# Patient Record
Sex: FEMALE | Race: WHITE | NOT HISPANIC OR LATINO | URBAN - METROPOLITAN AREA
[De-identification: names, ages, dates, MRNs, and addresses within clinical notes are randomized per-mention and may not be internally consistent; named-entity substitution may affect disease eponyms.]

---

## 2023-08-22 ENCOUNTER — OUT OF OFFICE VISIT (OUTPATIENT)
Dept: URBAN - METROPOLITAN AREA MEDICAL CENTER 25 | Facility: MEDICAL CENTER | Age: 83
End: 2023-08-22
Payer: MEDICARE

## 2023-08-22 ENCOUNTER — LAB OUTSIDE AN ENCOUNTER (OUTPATIENT)
Dept: URBAN - METROPOLITAN AREA CLINIC 19 | Facility: CLINIC | Age: 83
End: 2023-08-22

## 2023-08-22 DIAGNOSIS — D64.89 ANEMIA DUE TO OTHER CAUSE: ICD-10-CM

## 2023-08-22 DIAGNOSIS — K92.1 ACUTE MELENA: ICD-10-CM

## 2023-08-22 PROCEDURE — 99204 OFFICE O/P NEW MOD 45 MIN: CPT | Performed by: INTERNAL MEDICINE

## 2023-08-23 ENCOUNTER — OUT OF OFFICE VISIT (OUTPATIENT)
Dept: URBAN - METROPOLITAN AREA MEDICAL CENTER 25 | Facility: MEDICAL CENTER | Age: 83
End: 2023-08-23
Payer: MEDICARE

## 2023-08-23 DIAGNOSIS — K92.1 ACUTE MELENA: ICD-10-CM

## 2023-08-23 DIAGNOSIS — K22.89 DILATATION OF ESOPHAGUS: ICD-10-CM

## 2023-08-23 DIAGNOSIS — D62 ABLA (ACUTE BLOOD LOSS ANEMIA): ICD-10-CM

## 2023-08-23 DIAGNOSIS — K63.89 APPENDICITIS EPIPLOICA: ICD-10-CM

## 2023-08-23 PROCEDURE — 45380 COLONOSCOPY AND BIOPSY: CPT | Performed by: INTERNAL MEDICINE

## 2023-08-23 PROCEDURE — 43235 EGD DIAGNOSTIC BRUSH WASH: CPT | Performed by: INTERNAL MEDICINE

## 2023-08-30 ENCOUNTER — TELEPHONE ENCOUNTER (OUTPATIENT)
Dept: URBAN - METROPOLITAN AREA CLINIC 19 | Facility: CLINIC | Age: 83
End: 2023-08-30

## 2023-09-01 ENCOUNTER — TELEPHONE ENCOUNTER (OUTPATIENT)
Dept: URBAN - METROPOLITAN AREA CLINIC 19 | Facility: CLINIC | Age: 83
End: 2023-09-01

## 2023-09-05 ENCOUNTER — WEB ENCOUNTER (OUTPATIENT)
Dept: URBAN - METROPOLITAN AREA CLINIC 111 | Facility: CLINIC | Age: 83
End: 2023-09-05

## 2023-09-06 ENCOUNTER — OFFICE VISIT (OUTPATIENT)
Dept: URBAN - METROPOLITAN AREA CLINIC 111 | Facility: CLINIC | Age: 83
End: 2023-09-06
Payer: MEDICARE

## 2023-09-06 VITALS
WEIGHT: 171 LBS | SYSTOLIC BLOOD PRESSURE: 176 MMHG | HEIGHT: 62 IN | HEART RATE: 85 BPM | TEMPERATURE: 97.7 F | DIASTOLIC BLOOD PRESSURE: 87 MMHG | BODY MASS INDEX: 31.47 KG/M2

## 2023-09-06 DIAGNOSIS — K64.8 INTERNAL HEMORRHOID, BLEEDING: ICD-10-CM

## 2023-09-06 DIAGNOSIS — D50.8 ACHLORHYDRIC ANEMIA: ICD-10-CM

## 2023-09-06 DIAGNOSIS — K21.9 ACID REFLUX DISEASE WITH ULCER: ICD-10-CM

## 2023-09-06 PROBLEM — 87522002: Status: ACTIVE | Noted: 2023-09-06

## 2023-09-06 PROCEDURE — 99204 OFFICE O/P NEW MOD 45 MIN: CPT | Performed by: NURSE PRACTITIONER

## 2023-09-06 RX ORDER — PANTOPRAZOLE SODIUM 40 MG/1
1 TABLET TABLET, DELAYED RELEASE ORAL ONCE A DAY
Qty: 90 TABLET | Refills: 3 | OUTPATIENT
Start: 2023-09-06

## 2023-09-06 RX ORDER — HYDROCORTISONE ACETATE 25 MG/1
1 SUPPOSITORY SUPPOSITORY RECTAL ONCE A DAY
Qty: 12 | Refills: 0 | OUTPATIENT
Start: 2023-09-06 | End: 2023-10-06

## 2023-09-06 RX ORDER — ZOLPIDEM TARTRATE 5 MG/1
TABLET, FILM COATED ORAL
Qty: 0 | Refills: 0 | Status: ACTIVE | COMMUNITY
Start: 1900-01-01

## 2023-09-06 NOTE — HPI-TODAY'S VISIT:
82 yo female presents follow up after recent hospitalization for melena. She was hospitalized at [facility] [place] 8/24 for GI bleed. EGD on 8/23/23 [facility], one superficial esophageal ulcer. Colonoscopy--diverticulosis, benign polyp and non-bleeding hemorrhoids. CT abd 8/24/23 no acute abd finding, fatty liver and persistent splenomegaly. Hemoglobin was 11.4. She was treated with abx at that time.  She continues to have intermittent rectal discomfort with bright red blood on toilet tissue. Reports epigastric discomfort and heart burn after eating.  Denies fever, chill, n/v or dysphagia.

## 2023-09-13 LAB
ABSOLUTE BASOPHILS: 48
ABSOLUTE EOSINOPHILS: 178
ABSOLUTE LYMPHOCYTES: 1579
ABSOLUTE MONOCYTES: 398
ABSOLUTE NEUTROPHILS: 2597
BASOPHILS: 1
EOSINOPHILS: 3.7
FERRITIN, SERUM: 25
HEMATOCRIT: 35
HEMOGLOBIN: 11.7
IRON BIND.CAP.(TIBC): 328
IRON SATURATION: 16
IRON: 51
LYMPHOCYTES: 32.9
MCH: 29.5
MCHC: 33.4
MCV: 88.4
MONOCYTES: 8.3
MPV: 10.6
NEUTROPHILS: 54.1
PLATELET COUNT: 207
RDW: 13.1
RED BLOOD CELL COUNT: 3.96
WHITE BLOOD CELL COUNT: 4.8

## 2023-10-06 ENCOUNTER — OFFICE VISIT (OUTPATIENT)
Dept: URBAN - METROPOLITAN AREA CLINIC 111 | Facility: CLINIC | Age: 83
End: 2023-10-06
Payer: MEDICARE

## 2023-10-06 ENCOUNTER — WEB ENCOUNTER (OUTPATIENT)
Dept: URBAN - METROPOLITAN AREA CLINIC 111 | Facility: CLINIC | Age: 83
End: 2023-10-06

## 2023-10-06 VITALS
WEIGHT: 168 LBS | HEIGHT: 62 IN | SYSTOLIC BLOOD PRESSURE: 128 MMHG | TEMPERATURE: 97.5 F | HEART RATE: 93 BPM | BODY MASS INDEX: 30.91 KG/M2 | DIASTOLIC BLOOD PRESSURE: 77 MMHG

## 2023-10-06 DIAGNOSIS — K76.0 FATTY LIVER: ICD-10-CM

## 2023-10-06 PROCEDURE — 99213 OFFICE O/P EST LOW 20 MIN: CPT | Performed by: NURSE PRACTITIONER

## 2023-10-06 RX ORDER — HYDROCORTISONE ACETATE 25 MG/1
1 SUPPOSITORY SUPPOSITORY RECTAL ONCE A DAY
Qty: 12 | Refills: 0 | Status: ACTIVE | COMMUNITY
Start: 2023-09-06 | End: 2023-10-06

## 2023-10-06 RX ORDER — PANTOPRAZOLE SODIUM 40 MG/1
1 TABLET TABLET, DELAYED RELEASE ORAL ONCE A DAY
Qty: 90 TABLET | Refills: 3 | Status: ACTIVE | COMMUNITY
Start: 2023-09-06

## 2023-10-06 RX ORDER — ZOLPIDEM TARTRATE 5 MG/1
TABLET, FILM COATED ORAL
Qty: 0 | Refills: 0 | Status: ACTIVE | COMMUNITY
Start: 1900-01-01

## 2023-10-06 NOTE — HPI-TODAY'S VISIT:
82 yo female presents 1 month follow up. Patient was last seen by me on 9/6/23 after  hospitalization  [facility] 8/22 to 8/24 for GI bleed. Labs rev'd on 9/12/23 iron study nml, h/h 11/7/35.0. Patient feeling better. Denies any gi symptoms. No melena or hematochezia. She doesn't drink alcohol or take Tylenol or NSAIDs. EGD on 8/23/23 [facility], one superficial esophageal ulcer. Colonoscopy--diverticulosis, benign polyp and non-bleeding hemorrhoids. CT abd 8/24/23 no acute abd finding, fatty liver and persistent splenomegaly.

## 2023-10-09 PROBLEM — 197321007: Status: ACTIVE | Noted: 2023-10-09

## 2023-10-10 ENCOUNTER — LAB OUTSIDE AN ENCOUNTER (OUTPATIENT)
Dept: URBAN - METROPOLITAN AREA CLINIC 111 | Facility: CLINIC | Age: 83
End: 2023-10-10

## 2023-10-11 LAB
ABSOLUTE BASOPHILS: 32
ABSOLUTE EOSINOPHILS: 191
ABSOLUTE LYMPHOCYTES: 2009
ABSOLUTE MONOCYTES: 493
ABSOLUTE NEUTROPHILS: 2576
ALBUMIN/GLOBULIN RATIO: 1.6
ALBUMIN: 3.9
ALKALINE PHOSPHATASE: 112
ALT: 14
AST: 16
BASOPHILS: 0.6
BILIRUBIN, TOTAL: 0.4
BUN/CREATININE RATIO: (no result)
CALCIUM: 8.9
CARBON DIOXIDE: 27
CHLORIDE: 107
CREATININE: 0.8
EOSINOPHILS: 3.6
GLOBULIN: 2.4
GLUCOSE: 94
HEMATOCRIT: 37.2
HEMOGLOBIN: 12.3
HEPATITIS A AB, TOTAL: REACTIVE
HEPATITIS B CORE AB TOTAL: (no result)
HEPATITIS B SURFACE AB IMMUNITY, QN: <5
HEPATITIS B SURFACE ANTIGEN: (no result)
HEPATITIS C ANTIBODY: (no result)
INR: 1.1
LYMPHOCYTES: 37.9
MCH: 28.8
MCHC: 33.1
MCV: 87.1
MONOCYTES: 9.3
MPV: 10.5
NEUTROPHILS: 48.6
PLATELET COUNT: 196
POTASSIUM: 4.1
PROTEIN, TOTAL: 6.3
PT: 11.2
RDW: 13.5
RED BLOOD CELL COUNT: 4.27
SODIUM: 141
UREA NITROGEN (BUN): 17
WHITE BLOOD CELL COUNT: 5.3

## 2023-10-13 ENCOUNTER — WEB ENCOUNTER (OUTPATIENT)
Dept: URBAN - METROPOLITAN AREA CLINIC 5 | Facility: CLINIC | Age: 83
End: 2023-10-13

## 2023-12-19 ENCOUNTER — OFFICE VISIT (OUTPATIENT)
Dept: URBAN - METROPOLITAN AREA CLINIC 19 | Facility: CLINIC | Age: 83
End: 2023-12-19
Payer: MEDICARE

## 2023-12-19 VITALS
HEIGHT: 62 IN | TEMPERATURE: 97.2 F | DIASTOLIC BLOOD PRESSURE: 64 MMHG | BODY MASS INDEX: 32.39 KG/M2 | WEIGHT: 176 LBS | SYSTOLIC BLOOD PRESSURE: 102 MMHG | HEART RATE: 70 BPM

## 2023-12-19 DIAGNOSIS — D50.9 IRON DEFICIENCY ANEMIA, UNSPECIFIED IRON DEFICIENCY ANEMIA TYPE: ICD-10-CM

## 2023-12-19 DIAGNOSIS — R16.1 SPLENOMEGALY: ICD-10-CM

## 2023-12-19 DIAGNOSIS — Z85.3 HISTORY OF BREAST CANCER: ICD-10-CM

## 2023-12-19 DIAGNOSIS — K76.0 FATTY LIVER: ICD-10-CM

## 2023-12-19 DIAGNOSIS — K76.89 LIVER CYST: ICD-10-CM

## 2023-12-19 PROCEDURE — 99213 OFFICE O/P EST LOW 20 MIN: CPT | Performed by: INTERNAL MEDICINE

## 2023-12-19 RX ORDER — ZOLPIDEM TARTRATE 5 MG/1
TABLET, FILM COATED ORAL
Qty: 0 | Refills: 0 | Status: ACTIVE | COMMUNITY
Start: 1900-01-01

## 2023-12-19 RX ORDER — PANTOPRAZOLE SODIUM 40 MG/1
1 TABLET TABLET, DELAYED RELEASE ORAL ONCE A DAY
Qty: 90 TABLET | Refills: 3 | Status: ACTIVE | COMMUNITY
Start: 2023-09-06

## 2023-12-19 NOTE — HPI-TODAY'S VISIT:
10/6/23 (Heena Amor NP): 84 yo female presents 1 month follow up. Patient was last seen by me on 9/6/23 after  hospitalization  [facility] 8/22 to 8/24 for GI bleed. Labs rev'd on 9/12/23 iron study nml, h/h 11/7/35.0. Patient feeling better. Denies any gi symptoms. No melena or hematochezia. She doesn't drink alcohol or take Tylenol or NSAIDs. EGD on 8/23/23 [facility], one superficial esophageal ulcer. Colonoscopy--diverticulosis, benign polyp and non-bleeding hemorrhoids. CT abd 8/24/23 no acute abd finding, fatty liver and persistent splenomegaly.   12/19/23: Patient presents for follow-up of her abnormal imaging. Heena Amor NP, ordered labs at her last clinic visit, which were collected on 10/10/23. All of her labs were normal except for a positive hepatitis A total IgG, suggesting prior exposure. Although her CT showed a fatty liver and splenomegaly, there is no evidence that she has cirrhosis. Recommend healthy diet (Mediterranean diet) and exercise. No signs of gastrointestinal bleeding. No further evaluation needed.

## 2024-02-29 ENCOUNTER — OV EP (OUTPATIENT)
Dept: URBAN - METROPOLITAN AREA CLINIC 23 | Facility: CLINIC | Age: 84
End: 2024-02-29

## 2024-02-29 VITALS
DIASTOLIC BLOOD PRESSURE: 80 MMHG | WEIGHT: 174 LBS | TEMPERATURE: 97.9 F | HEIGHT: 62 IN | SYSTOLIC BLOOD PRESSURE: 143 MMHG | BODY MASS INDEX: 32.02 KG/M2 | HEART RATE: 77 BPM

## 2024-02-29 RX ORDER — PANTOPRAZOLE SODIUM 40 MG/1
1 TABLET TABLET, DELAYED RELEASE ORAL ONCE A DAY
Qty: 90 TABLET | Refills: 3 | Status: ACTIVE | COMMUNITY
Start: 2023-09-06

## 2024-02-29 RX ORDER — ZOLPIDEM TARTRATE 5 MG/1
TABLET, FILM COATED ORAL
Qty: 0 | Refills: 0 | Status: ACTIVE | COMMUNITY
Start: 1900-01-01

## 2024-02-29 NOTE — EXAM-PHYSICAL EXAM
colonoscopy 8//2024- 3 mm polyp, diverticulosis  internal hemorrhoids normal TI, hyperplastic polyp egd 8/2024-  GE junction esophagitis, small ulcer

## 2024-04-05 ENCOUNTER — OV EP (OUTPATIENT)
Dept: URBAN - METROPOLITAN AREA CLINIC 111 | Facility: CLINIC | Age: 84
End: 2024-04-05